# Patient Record
Sex: MALE | Race: BLACK OR AFRICAN AMERICAN | Employment: FULL TIME | URBAN - METROPOLITAN AREA
[De-identification: names, ages, dates, MRNs, and addresses within clinical notes are randomized per-mention and may not be internally consistent; named-entity substitution may affect disease eponyms.]

---

## 2019-03-20 ENCOUNTER — OFFICE VISIT (OUTPATIENT)
Dept: NEUROLOGY | Age: 49
End: 2019-03-20

## 2019-03-20 VITALS
HEART RATE: 68 BPM | BODY MASS INDEX: 26.81 KG/M2 | WEIGHT: 166.8 LBS | HEIGHT: 66 IN | OXYGEN SATURATION: 98 % | SYSTOLIC BLOOD PRESSURE: 122 MMHG | RESPIRATION RATE: 18 BRPM | DIASTOLIC BLOOD PRESSURE: 78 MMHG

## 2019-03-20 DIAGNOSIS — M79.2 NEURALGIA INVOLVING SCALP: Primary | ICD-10-CM

## 2019-03-20 RX ORDER — AMITRIPTYLINE HYDROCHLORIDE 10 MG/1
TABLET, FILM COATED ORAL
COMMUNITY
End: 2019-06-06 | Stop reason: SDUPTHER

## 2019-03-20 RX ORDER — DOXYCYCLINE 100 MG/1
100 CAPSULE ORAL 2 TIMES DAILY
COMMUNITY

## 2019-03-20 RX ORDER — METHYLPREDNISOLONE 4 MG/1
TABLET ORAL
COMMUNITY

## 2019-03-20 RX ORDER — DICYCLOMINE HYDROCHLORIDE 20 MG/1
20 TABLET ORAL 3 TIMES DAILY
COMMUNITY

## 2019-03-20 NOTE — PATIENT INSTRUCTIONS
A Healthy Lifestyle: Care Instructions Your Care Instructions A healthy lifestyle can help you feel good, stay at a healthy weight, and have plenty of energy for both work and play. A healthy lifestyle is something you can share with your whole family. A healthy lifestyle also can lower your risk for serious health problems, such as high blood pressure, heart disease, and diabetes. You can follow a few steps listed below to improve your health and the health of your family. Follow-up care is a key part of your treatment and safety. Be sure to make and go to all appointments, and call your doctor if you are having problems. It's also a good idea to know your test results and keep a list of the medicines you take. How can you care for yourself at home? · Do not eat too much sugar, fat, or fast foods. You can still have dessert and treats now and then. The goal is moderation. · Start small to improve your eating habits. Pay attention to portion sizes, drink less juice and soda pop, and eat more fruits and vegetables. ? Eat a healthy amount of food. A 3-ounce serving of meat, for example, is about the size of a deck of cards. Fill the rest of your plate with vegetables and whole grains. ? Limit the amount of soda and sports drinks you have every day. Drink more water when you are thirsty. ? Eat at least 5 servings of fruits and vegetables every day. It may seem like a lot, but it is not hard to reach this goal. A serving or helping is 1 piece of fruit, 1 cup of vegetables, or 2 cups of leafy, raw vegetables. Have an apple or some carrot sticks as an afternoon snack instead of a candy bar. Try to have fruits and/or vegetables at every meal. 
· Make exercise part of your daily routine. You may want to start with simple activities, such as walking, bicycling, or slow swimming. Try to be active 30 to 60 minutes every day.  You do not need to do all 30 to 60 minutes all at once. For example, you can exercise 3 times a day for 10 or 20 minutes. Moderate exercise is safe for most people, but it is always a good idea to talk to your doctor before starting an exercise program. 
· Keep moving. [place] the lawn, work in the garden, or [employer]. Take the stairs instead of the elevator at work. · If you smoke, quit. People who smoke have an increased risk for heart attack, stroke, cancer, and other lung illnesses. Quitting is hard, but there are ways to boost your chance of quitting tobacco for good. ? Use nicotine gum, patches, or lozenges. ? Ask your doctor about stop-smoking programs and medicines. ? Keep trying. In addition to reducing your risk of diseases in the future, you will notice some benefits soon after you stop using tobacco. If you have shortness of breath or asthma symptoms, they will likely get better within a few weeks after you quit. · Limit how much alcohol you drink. Moderate amounts of alcohol (up to 2 drinks a day for men, 1 drink a day for women) are okay. But drinking too much can lead to liver problems, high blood pressure, and other health problems. Family health If you have a family, there are many things you can do together to improve your health. · Eat meals together as a family as often as possible. · Eat healthy foods. This includes fruits, vegetables, lean meats and dairy, and whole grains. · Include your family in your fitness plan. Most people think of activities such as jogging or tennis as the way to fitness, but there are many ways you and your family can be more active. Anything that makes you breathe hard and gets your heart pumping is exercise. Here are some tips: 
? Walk to do errands or to take your child to school or the bus. 
? Go for a family bike ride after dinner instead of watching TV. Where can you learn more? Go to http://zohaib-clau.info/. Enter H370 in the search box to learn more about \"A Healthy Lifestyle: Care Instructions. \" Current as of: September 11, 2018 Content Version: 11.9 © 9786-8278 [employer]. Care instructions adapted under license by [employer] (which disclaims liability or warranty for this information). If you have questions about a medical condition or this instruction, always ask your healthcare professional. [place] [address] any warranty or liability for your use of this information.

## 2019-03-20 NOTE — PROGRESS NOTES
Mr. Clark Berrios presents as a new patient for evaluation of a burning sensation on the top of his head. His symptoms began three weeks ago. Depression screening done on patient.

## 2019-03-20 NOTE — PROGRESS NOTES
Chief Complaint Patient presents with  Neurologic Problem Scalp hypersensitivity Referred by:  
Self-referred HPI Mr. Inessa Bhandari is a 44-year-old gentleman with a history of intermittent rare migraine headaches here for a change in symptoms. He tells me for the past 3 weeks he is noticed at the top of his head possibly just left of midline vertex he feels this hypersensitivity burning paresthesias in a singular patch without radiation. It is not painful. He has had no trauma. No nausea or light sensitivity. He had the same thing occurred 10 years ago that resolved spontaneously. He has no complaints of numbness or weakness. He still physically active every day. He saw primary care and was given the beginning of doxycycline and amitriptyline and methylprednisolone. Review of Systems Constitutional: Negative for malaise/fatigue. Eyes: Negative for double vision and photophobia. Gastrointestinal: Negative for nausea. Neurological: Negative for focal weakness and headaches. Painful burning sensation scalp All other systems reviewed and are negative. History reviewed. No pertinent past medical history. Family History Problem Relation Age of Onset  Cancer Mother  Seizures Brother  Dementia Paternal Grandmother Social History Socioeconomic History  Marital status:  Spouse name: Not on file  Number of children: Not on file  Years of education: Not on file  Highest education level: Not on file Occupational History  Not on file Social Needs  Financial resource strain: Not on file  Food insecurity:  
  Worry: Not on file Inability: Not on file  Transportation needs:  
  Medical: Not on file Non-medical: Not on file Tobacco Use  Smoking status: Current Some Day Smoker  Smokeless tobacco: Never Used Substance and Sexual Activity  Alcohol use: Yes  Drug use: Not on file  Sexual activity: Not on file Lifestyle  Physical activity:  
  Days per week: Not on file Minutes per session: Not on file  Stress: Not on file Relationships  Social connections:  
  Talks on phone: Not on file Gets together: Not on file Attends Evangelical service: Not on file Active member of club or organization: Not on file Attends meetings of clubs or organizations: Not on file Relationship status: Not on file  Intimate partner violence:  
  Fear of current or ex partner: Not on file Emotionally abused: Not on file Physically abused: Not on file Forced sexual activity: Not on file Other Topics Concern  Not on file Social History Narrative  Not on file Current Outpatient Medications Medication Sig  
 methylPREDNISolone (MEDROL DOSEPACK) 4 mg tablet Take  by mouth.  doxycycline (VIBRAMYCIN) 100 mg capsule Take 100 mg by mouth two (2) times a day.  dicyclomine (BENTYL) 20 mg tablet Take 20 mg by mouth three (3) times daily.  amitriptyline (ELAVIL) 10 mg tablet Take  by mouth nightly. No current facility-administered medications for this visit. Allergies not on file Neurologic Exam  
 
Mental Status Oriented to person, place, and time. Cranial Nerves Cranial nerves II through XII intact. Motor Exam  
Muscle bulk: normal 
 
Strength Strength 5/5 throughout. Sensory Exam  
Light touch normal.  
 
Gait, Coordination, and Reflexes Gait Gait: normal 
 
Coordination Romberg: negative Finger to nose coordination: normal 
 
Tremor Resting tremor: absent Reflexes Right brachioradialis: 2+ Left brachioradialis: 2+ Right biceps: 2+ Left biceps: 2+ Right triceps: 2+ Left triceps: 2+ Right patellar: 3+ Left patellar: 3+ Right achilles: 2+ Left achilles: 2+ Physical Exam  
Constitutional: He is oriented to person, place, and time. He appears well-developed and well-nourished.   
HENT:  
 Scalp without evidence of infection. Skin is dry and intact throughout. No edema. Nontender. Cardiovascular: Normal rate. Pulmonary/Chest: Effort normal.  
Neurological: He is oriented to person, place, and time. He has normal strength. He has a normal Finger-Nose-Finger Test and a normal Romberg Test. Gait normal.  
Reflex Scores: 
     Tricep reflexes are 2+ on the right side and 2+ on the left side. Bicep reflexes are 2+ on the right side and 2+ on the left side. Brachioradialis reflexes are 2+ on the right side and 2+ on the left side. Patellar reflexes are 3+ on the right side and 3+ on the left side. Achilles reflexes are 2+ on the right side and 2+ on the left side. Skin: Skin is warm and dry. Psychiatric: His behavior is normal.  
Vitals reviewed. Visit Vitals /78 Pulse 68 Resp 18 Ht 5' 6\" (1.676 m) Wt 75.7 kg (166 lb 12.8 oz) SpO2 98% BMI 26.92 kg/m² No labs to review Assessment and Plan Diagnoses and all orders for this visit: 1. Neuralgia involving scalp 79-year-old gentleman complaining of persistent left vertex scalp painful paresthesias without radiation. On examination I do not see evidence of inflammation or infection. He has some type of neuralgia that I think is benign. Imaging would not be helpful. I do not think this is a life-threatening issue. I do not think there is any indication for him to continue taking amitriptyline or doxycycline or methylprednisolone. This may resolve spontaneously. He does not want to take medication unnecessarily which I agree in this case. He is interested in alternative therapy so he should consider acupuncture. He will look into that because he did that 10 years ago when he had the symptoms first present. Questions answered at length. Exam otherwise unrevealing. Call if anything changes acutely.  
 
 
 
A notice of this visit/encounter being completed has been sent electronically to the patient's PCP and/or referring provider. [facility], DO 
NEUROLOGIST Diplomate ABPN

## 2019-05-16 ENCOUNTER — TELEPHONE (OUTPATIENT)
Dept: NEUROLOGY | Age: 49
End: 2019-05-16

## 2019-05-16 NOTE — TELEPHONE ENCOUNTER
----- Message from Susie Abernathy sent at 5/16/2019  2:06 PM EDT -----  Regarding: Dr Rocky Garcia, wife, is requesting an earlier appt for a follow up for pt.  Current appt is scheduled 09/03/19 at 11:20    Best contact [phone]

## 2019-05-17 ENCOUNTER — TELEPHONE (OUTPATIENT)
Dept: NEUROLOGY | Age: 49
End: 2019-05-17

## 2019-06-06 ENCOUNTER — OFFICE VISIT (OUTPATIENT)
Dept: NEUROLOGY | Age: 49
End: 2019-06-06

## 2019-06-06 VITALS
BODY MASS INDEX: 28.32 KG/M2 | RESPIRATION RATE: 16 BRPM | SYSTOLIC BLOOD PRESSURE: 125 MMHG | OXYGEN SATURATION: 95 % | DIASTOLIC BLOOD PRESSURE: 82 MMHG | WEIGHT: 176.2 LBS | HEART RATE: 82 BPM | HEIGHT: 66 IN

## 2019-06-06 DIAGNOSIS — M79.2 NEURALGIA INVOLVING SCALP: Primary | ICD-10-CM

## 2019-06-06 RX ORDER — AMITRIPTYLINE HYDROCHLORIDE 10 MG/1
10 TABLET, FILM COATED ORAL
Qty: 30 TAB | Refills: 3 | Status: SHIPPED | OUTPATIENT
Start: 2019-06-06

## 2019-06-06 NOTE — PROGRESS NOTES
Date:  19     Name:  Jamey Pennington  :  1970  MRN:  [id number]     PCP:  No primary care provider on file. Chief Complaint   Patient presents with    Neurologic Problem       HISTORY OF PRESENT ILLNESS: Follow-up visit for neuralgia. This is a 82-year-old male who presents today with increased burning pain on the top of his head. His previously he did want not want to take any medications for his neuralgia, acupuncture was offered. Acupuncture was working pretty well however the burning sensation returned. He called an online provider who prescribed him amitriptyline 10 mg at night. He says since taking his medication symptoms have subside. Today he was wondering if he is able to get a refill on the amitriptyline. He also reports that he has lost 2 jobs to be pain; due to his inability to sleep at night causing him to be irritable during the day. Current Outpatient Medications   Medication Sig    amitriptyline (ELAVIL) 10 mg tablet Take 1 Tab by mouth nightly.  dicyclomine (BENTYL) 20 mg tablet Take 20 mg by mouth three (3) times daily.  methylPREDNISolone (MEDROL DOSEPACK) 4 mg tablet Take  by mouth.  doxycycline (VIBRAMYCIN) 100 mg capsule Take 100 mg by mouth two (2) times a day. No current facility-administered medications for this visit. Not on File  History reviewed. No pertinent past medical history.   Past Surgical History:   Procedure Laterality Date    HX ORTHOPAEDIC       Social History     Socioeconomic History    Marital status:      Spouse name: Not on file    Number of children: Not on file    Years of education: Not on file    Highest education level: Not on file   Occupational History    Not on file   Social Needs    Financial resource strain: Not on file    Food insecurity:     Worry: Not on file     Inability: Not on file    Transportation needs:     Medical: Not on file     Non-medical: Not on file   Tobacco Use    Smoking status: Current Some Day Smoker    Smokeless tobacco: Never Used   Substance and Sexual Activity    Alcohol use: Yes    Drug use: Not on file    Sexual activity: Not on file   Lifestyle    Physical activity:     Days per week: Not on file     Minutes per session: Not on file    Stress: Not on file   Relationships    Social connections:     Talks on phone: Not on file     Gets together: Not on file     Attends Zoroastrian service: Not on file     Active member of club or organization: Not on file     Attends meetings of clubs or organizations: Not on file     Relationship status: Not on file    Intimate partner violence:     Fear of current or ex partner: Not on file     Emotionally abused: Not on file     Physically abused: Not on file     Forced sexual activity: Not on file   Other Topics Concern    Not on file   Social History Narrative    Not on file     Family History   Problem Relation Age of Onset    Cancer Mother     Seizures Brother     Dementia Paternal Grandmother        PHYSICAL EXAMINATION:    Visit Vitals  /82   Pulse 82   Resp 16   Ht 5' 6\" (1.676 m)   Wt 79.9 kg (176 lb 3.2 oz)   SpO2 95%   BMI 28.44 kg/m²     General: Well defined, nourished, and groomed individual in no acute distress. Neck: Supple, nontender, no bruits, no pain with resistance to active range of motion. Heart: Regular rate and rhythm, no murmurs, rub, or gallop. Normal S1S2. Lungs: Clear to auscultation bilaterally with equal chest expansion, no cough, no wheeze  Musculoskeletal: Extremities revealed no edema and had full range of motion of joints. Psych: Good mood and bright affect      NEUROLOGICAL EXAMINATION:   Mental Status: Alert and oriented to person, place, and time       Cranial Nerves:   II, III, IV, VI: Visual acuity grossly intact. Visual fields are normal.   Pupils are equal, round, and reactive to light and accommodation. Extra-ocular movements are full and fluid.  Fundoscopic exam was benign, no ptosis or nystagmus. V-XII: Hearing is grossly intact. Facial features are symmetric, with normal sensation and strength. The palate rises symmetrically and the tongue protrudes midline. Sternocleidomastoids 5/5.       Motor Examination: Normal tone, bulk, and strength, 5/5 muscle strength throughout.       Coordination: No resting or intention tremor      Gait and Station: Steady while walking. Normal arm swing. No pronator drift. No muscle wasting or fasiculations noted.       Reflexes: DTRs 2+ throughout. ASSESSMENT AND PLAN    ICD-10-CM ICD-9-CM    1. Neuralgia involving scalp M79.2 729.2       57year-old gentleman complaining of persistent left vertex scalp painful paresthesias without radiation. On examination I do not see evidence of inflammation or infection. He was prescirbed  amitriptyline 10 mg, that seem to be working well. Will refill the medication as prescribed. Follow up in 3 months. This note will not be viewable in [address]   Maryam Rutledge NP

## 2019-06-06 NOTE — PATIENT INSTRUCTIONS
PRESCRIPTION REFILL POLICY [facility] Statement to Patients April 1, 2014 In an effort to ensure the large volume of patient prescription refills is processed in the most efficient and expeditious manner, we are asking our patients to assist us by calling your Pharmacy for all prescription refills, this will include also your  Mail Order Pharmacy. The pharmacy will contact our office electronically to continue the refill process. Please do not wait until the last minute to call your pharmacy. We need at least 48 hours (2days) to fill prescriptions. We also encourage you to call your pharmacy before going to  your prescription to make sure it is ready. With regard to controlled substance prescription refill requests (narcotic refills) that need to be picked up at our office, we ask your cooperation by providing us with at least 72 hours (3days) notice that you will need a refill. We will not refill narcotic prescription refill requests after 4:00pm on any weekday, Monday through Thursday, or after 2:00pm on Fridays, or on the weekends. We encourage everyone to explore another way of getting your prescription refill request processed using [employer], our patient web portal through our electronic medical record system. [employer] is an efficient and effective way to communicate your medication request directly to the office and  downloadable as an chase on your smart phone . [employer] also features a review functionality that allows you to view your medication list as well as leave messages for your physician. Are you ready to get connected? If so please review the attatched instructions or speak to any of our staff to get you set up right away! Thank you so much for your cooperation. Should you have any questions please contact our Practice Administrator. The Physicians and Staff,  [facility]

## 2019-06-06 NOTE — PROGRESS NOTES
MrSurinder Sanchez presents today to follow up burning of head. His symptoms have not changed since last visit. Depression screening done on patient.

## 2019-06-11 ENCOUNTER — TELEPHONE (OUTPATIENT)
Dept: NEUROLOGY | Age: 49
End: 2019-06-11

## 2019-06-11 NOTE — TELEPHONE ENCOUNTER
Patient's wife calling stating that patient is still experiencing burning sensation on top of head. Patient is really irritable and worried. Patient would like a call back from nurse to get advice on what to do between now and scheduled follow up 07/31/19 with Janessa Carmichael NP. Please advise.     Best # [phone]

## 2019-06-11 NOTE — TELEPHONE ENCOUNTER
* Message from Jeanna below:        ----- Message from Nilda Noel sent at 6/11/2019  2:59 PM EDT -----  Regarding: Florencio/Telephone  Pt called requesting a call back in regards to what can pt do for relief for the burning sensation on top of head until scheduled appt Tuesday , July 23, at 2:00 PM . Pt best contact number is [phone] .

## 2019-06-12 NOTE — TELEPHONE ENCOUNTER
I left a message for patient stating treatment options will be discussed at visit and advised that he call back twice weekly to look for cancellations in schedule if his symptoms continue.

## 2019-06-20 ENCOUNTER — TELEPHONE (OUTPATIENT)
Dept: NEUROLOGY | Age: 49
End: 2019-06-20

## 2019-06-20 NOTE — TELEPHONE ENCOUNTER
Pt was given Carbamazapine 200mg BID and Toradol prn in the ER last night    Just checking with you before he continues these meds.

## 2019-06-20 NOTE — TELEPHONE ENCOUNTER
Pt's wife calling needing to speak to the nurse about a few things. Pt was in the ER last night and was prescribed about a bunch of new medications.  Pt's wife just wants to go over the next steps and how to process with new meds and such, Please call back

## 2019-06-20 NOTE — TELEPHONE ENCOUNTER
Toradol okay. Use if needed if severe. Not for daily use. Tegretol typically I would recommend 100 mg twice a day to start and continue daily. Going straight to 200 mg twice a day might cause some imbalance and dizziness.

## 2019-06-20 NOTE — TELEPHONE ENCOUNTER
Grace Kearns as above. She agreed to this plan. Dr. Radha Michaels reported patient is also taking Amitriptyline 10mg q PM. She wanted to determine if patient should stay on this medication. Please advise.

## 2019-12-06 ENCOUNTER — HOSPITAL ENCOUNTER (EMERGENCY)
Age: 49
Discharge: HOME | End: 2019-12-07
Attending: EMERGENCY MEDICINE
Payer: COMMERCIAL

## 2019-12-06 DIAGNOSIS — F10.920 ALCOHOLIC INTOXICATION WITHOUT COMPLICATION (HCC): Primary | ICD-10-CM

## 2019-12-06 LAB — ETHANOL SERPL-MCNC: 398 MG/DL

## 2019-12-06 PROCEDURE — 36415 COLL VENOUS BLD VENIPUNCTURE: CPT

## 2019-12-06 PROCEDURE — 99284 EMERGENCY DEPT VISIT MOD MDM: CPT

## 2019-12-06 PROCEDURE — 99285 EMERGENCY DEPT VISIT HI MDM: CPT

## 2019-12-06 PROCEDURE — 80307 DRUG TEST PRSMV CHEM ANLYZR: CPT

## 2019-12-07 VITALS
DIASTOLIC BLOOD PRESSURE: 83 MMHG | HEIGHT: 70 IN | WEIGHT: 169.97 LBS | HEART RATE: 76 BPM | SYSTOLIC BLOOD PRESSURE: 131 MMHG | RESPIRATION RATE: 16 BRPM | BODY MASS INDEX: 24.33 KG/M2 | OXYGEN SATURATION: 95 %

## 2019-12-07 NOTE — ED NOTES
Patient sitting up on edge of bed. Patient updated on plan of care. Patient back in bed metabolizing to freedom.

## 2019-12-07 NOTE — ED NOTES
Patient in ED via EMS c/o alcohol intoxication. EMS reports call was for unconscious patient however patient was ambulatory at the scene. EMS reports [employer] has his weapon that was on him on arrival. Patient admits to drinking denies illicit drugs. Patient sad and tearful. Patient reports his wife let him go after 32 years of marriage. Emergency Department Nursing Plan of Care       The Nursing Plan of Care is developed from the Nursing assessment and Emergency Department Attending provider initial evaluation. The plan of care may be reviewed in the ED Provider note.     The Plan of Care was developed with the following considerations:   Patient / Family readiness to learn indicated by:verbalized understanding  Persons(s) to be included in education: patient  Barriers to Learning/Limitations:No    Signed     Tami Panda RN    12/6/2019   10:21 PM

## 2019-12-07 NOTE — ED NOTES
Patient found crying on side of bed. Patient tearful and stating \"what did I do?\" Patient vague on incident with wife.

## 2019-12-07 NOTE — ED NOTES
Provider at bedside. Patient wants to go home but is very emotional and crying. Patient denies SI/HI. Patient reports he has his own place to stay.

## 2019-12-07 NOTE — ED NOTES
Patient pleasant at this time. Alert and oriented x 4. Patient given copy of dc instructions and 0 paper script(s) and 0 electronic scripts. Patient verbalized understanding of instructions and script (s). Patient given a current medication reconciliation form and verbalized understanding of their medications. Patient verbalized understanding of the importance of discussing medications with  his or her physician or clinic they will be following up with. Patient alert and oriented and in no acute distress. Patient offered wheelchair from treatment area to hospital entrance, patient declined wheelchair.

## 2019-12-07 NOTE — DISCHARGE INSTRUCTIONS
Patient Education        Acute Alcohol Intoxication: Care Instructions  Your Care Instructions    You have had treatment to help your body rid itself of alcohol. Too much alcohol upsets the body's fluid balance. Your doctor may have given you fluids and vitamins. For some people, drinking too much alcohol is a one-time event. For others, it is an ongoing problem. In either case, it is serious. It can be life-threatening. Follow-up care is a key part of your treatment and safety. Be sure to make and go to all appointments, and call your doctor if you are having problems. It's also a good idea to know your test results and keep a list of the medicines you take. How can you care for yourself at home? · Do not drink and drive. · Be safe with medicines. Take your medicines exactly as prescribed. Call your doctor if you think you are having a problem with your medicine. · Your doctor may have prescribed disulfiram (Antabuse). Do not drink any alcohol while you are taking this medicine. You may have severe or even life-threatening side effects from even small amounts of alcohol. · If you were given medicine to prevent nausea, be sure to take it exactly as prescribed. · Before you take any medicine, tell your doctor if:  ? You have had a bad reaction to any medicines in the past.  ? You are taking other medicines, including over-the-counter ones, or have other health problems. ? You are or could be pregnant. · Be prepared to have some symptoms of withdrawal in the next few days. · Drink plenty of liquids in the next few days. · Seek help if you need it to stop drinking. Getting counseling and joining a support group can help you stay sober. Try a support group such as Alcoholics Anonymous. · Avoid alcohol when you take medicines. It can react with many medicines and cause serious problems. When should you call for help? Call 911 anytime you think you may need emergency care.  For example, call if:    · You feel confused and are seeing things that are not there.     · You are thinking about killing yourself or hurting others.     · You have a seizure.     · You vomit blood or what looks like coffee grounds.    Call your doctor now or seek immediate medical care if:    · You have trembling, restlessness, sweating, and other withdrawal symptoms that are new or that get worse.     · Your withdrawal symptoms come back after not bothering you for days or weeks.     · You can't stop vomiting.    Watch closely for changes in your health, and be sure to contact your doctor if:    · You need help to stop drinking. Where can you learn more? Go to http://zohaib-clau.info/. Enter T102 in the search box to learn more about \"Acute Alcohol Intoxication: Care Instructions. \"  Current as of: February 5, 2019  Content Version: 12.2  © 2738-0960 [employer]. Care instructions adapted under license by [employer] (which disclaims liability or warranty for this information). If you have questions about a medical condition or this instruction, always ask your healthcare professional. [address] any warranty or liability for your use of this information.

## 2019-12-07 NOTE — ED PROVIDER NOTES
EMERGENCY DEPARTMENT HISTORY AND PHYSICAL EXAM      Date: 12/6/2019  Patient Name: Chad Sandoval    History of Presenting Illness     Chief Complaint   Patient presents with    Alcohol intoxication       History Provided By: Patient    HPI: Chad Sandoval, 54 y.o. male with PMHx significant for current alcohol intoxication, presents by EMS to the ED with cc of alcohol intoxication. This is a 56-year-old male who was found to be intoxicated on the street. A friend of his called EMS who brings him to the ER. Patient is very emotionally labile and very tearful. He had a disagreement with his wife tonight which made him very upset. Denies any pain. He denies suicidal or homicidal ideation. Denies any other drug use. There are no other complaints, changes, or physical findings at this time. PCP: None    Current Outpatient Medications   Medication Sig Dispense Refill    amitriptyline (ELAVIL) 10 mg tablet Take 1 Tab by mouth nightly. 30 Tab 3    methylPREDNISolone (MEDROL DOSEPACK) 4 mg tablet Take  by mouth.  doxycycline (VIBRAMYCIN) 100 mg capsule Take 100 mg by mouth two (2) times a day.  dicyclomine (BENTYL) 20 mg tablet Take 20 mg by mouth three (3) times daily. Past History     Past Medical History:  History reviewed. No pertinent past medical history. Past Surgical History:  Past Surgical History:   Procedure Laterality Date    HX ORTHOPAEDIC         Family History:  Family History   Problem Relation Age of Onset    Cancer Mother     Seizures Brother     Dementia Paternal Grandmother        Social History:  Social History     Tobacco Use    Smoking status: Current Some Day Smoker    Smokeless tobacco: Never Used   Substance Use Topics    Alcohol use: Yes    Drug use: Not on file       Allergies:  No Known Allergies      Review of Systems   Review of Systems   Constitutional: Negative for chills and fever.    HENT: Negative for congestion, rhinorrhea, sneezing and sore throat. Eyes: Negative for redness and visual disturbance. Respiratory: Negative for shortness of breath. Cardiovascular: Negative for chest pain and leg swelling. Gastrointestinal: Negative for abdominal pain, nausea and vomiting. Genitourinary: Negative for difficulty urinating and frequency. Musculoskeletal: Negative for back pain, myalgias and neck stiffness. Skin: Negative for rash. Neurological: Negative for dizziness, syncope, weakness and headaches. Hematological: Negative for adenopathy. Psychiatric/Behavioral: Positive for decreased concentration and dysphoric mood. Negative for agitation, behavioral problems, hallucinations, self-injury and suicidal ideas. The patient is not nervous/anxious. All other systems reviewed and are negative. Physical Exam   Physical Exam  Vitals signs and nursing note reviewed. Constitutional:       Appearance: Normal appearance. He is well-developed. HENT:      Head: Normocephalic and atraumatic. Neck:      Musculoskeletal: Full passive range of motion without pain, normal range of motion and neck supple. Cardiovascular:      Rate and Rhythm: Normal rate and regular rhythm. Pulses: Normal pulses. Heart sounds: Normal heart sounds. No murmur. Pulmonary:      Effort: Pulmonary effort is normal. No respiratory distress. Breath sounds: Normal breath sounds. Chest:      Chest wall: No tenderness. Abdominal:      General: Bowel sounds are normal.      Palpations: Abdomen is soft. Tenderness: There is no tenderness. There is no guarding or rebound. Skin:     General: Skin is warm and dry. Findings: No erythema or rash. Neurological:      Mental Status: He is alert and oriented to person, place, and time. Psychiatric:         Speech: Speech normal.         Behavior: Behavior normal.         Thought Content:  Thought content normal.         Judgment: Judgment normal.         Diagnostic Study Results     Labs - No results found for this or any previous visit (from the past 12 hour(s)). Radiologic Studies -   No orders to display     CT Results  (Last 48 hours)    None        CXR Results  (Last 48 hours)    None            Medical Decision Making   I am the first provider for this patient. I reviewed the vital signs, available nursing notes, past medical history, past surgical history, family history and social history. Vital Signs-Reviewed the patient's vital signs. No data found. Records Reviewed: Nursing Notes    Provider Notes (Medical Decision Making):   Alcohol intoxication with emotional disturbance. ED Course:   Initial assessment performed. The patients presenting problems have been discussed, and they are in agreement with the care plan formulated and outlined with them. I have encouraged them to ask questions as they arise throughout their visit. Disposition:  Patient informed of results of workup and is comfortable with discharge to home to follow up with PCP. They are instructed to return as needed for worsening condition. PLAN:  1. Discharge Medication List as of 12/7/2019  5:01 AM        2. Follow-up Information     Follow up With Specialties Details Why [address] [facility] [place] EMERGENCY DEPT Emergency Medicine  As needed, If symptoms worsen [facility]  [phone]        Return to ED if worse     Diagnosis     Clinical Impression:   1.  Alcoholic intoxication without complication ([facility] [address]